# Patient Record
Sex: FEMALE | Race: WHITE | NOT HISPANIC OR LATINO | URBAN - METROPOLITAN AREA
[De-identification: names, ages, dates, MRNs, and addresses within clinical notes are randomized per-mention and may not be internally consistent; named-entity substitution may affect disease eponyms.]

---

## 2017-11-28 ENCOUNTER — OFFICE VISIT (OUTPATIENT)
Dept: CARDIOLOGY | Facility: CLINIC | Age: 42
End: 2017-11-28
Payer: COMMERCIAL

## 2017-11-28 VITALS
HEIGHT: 65 IN | DIASTOLIC BLOOD PRESSURE: 75 MMHG | WEIGHT: 119 LBS | BODY MASS INDEX: 19.83 KG/M2 | HEART RATE: 79 BPM | SYSTOLIC BLOOD PRESSURE: 122 MMHG

## 2017-11-28 DIAGNOSIS — Z82.49 FAMILY HISTORY OF CORONARY ARTERY DISEASE: Primary | ICD-10-CM

## 2017-11-28 PROCEDURE — 93000 ELECTROCARDIOGRAM COMPLETE: CPT | Performed by: INTERNAL MEDICINE

## 2017-11-28 PROCEDURE — 99203 OFFICE O/P NEW LOW 30 MIN: CPT | Mod: 25 | Performed by: INTERNAL MEDICINE

## 2017-11-28 RX ORDER — LORATADINE 10 MG/1
10 CAPSULE, LIQUID FILLED ORAL DAILY
COMMUNITY

## 2017-11-28 RX ORDER — MULTIVIT-MIN/IRON/FOLIC ACID/K 18-600-40
CAPSULE ORAL DAILY
COMMUNITY

## 2017-11-28 RX ORDER — LEVOTHYROXINE SODIUM 112 UG/1
1 CAPSULE ORAL DAILY
COMMUNITY

## 2017-11-28 NOTE — PROGRESS NOTES
HISTORY OF PRESENT ILLNESS:  It is a pleasure for me to see this very delightful young lady for assessment of cardiac risk factors.  She has concerns over her family history.      This lady's mother had bypass surgery at the age of 68.  Mother was fit, slim lady who is physically active.  It sounds like she had a very abnormal stress test and then she had bypass surgery shortly after.  She did not have any atherosclerotic disease diagnosed before the age of 65.  Her mother's siblings have had stents.  Father's side is negative for premature atherosclerotic disease.  She has one brother who has diabetes mellitus.        This is a delightful physically active lady who works at as an  in the [employer].  She walks 4-5 miles a day.  She has no exertional chest discomfort or unusual shortness of breath.  She feels well.  Body mass index is less than 20.  She has no history of diabetes mellitus, hypertension.  She recently had a lipid profile tested and she was told this was completely normal.  She does not smoke, abuse alcohol or drugs.        Her physical examination is completely normal from a cardiovascular point of view.      IMPRESSION:   I reassured this lady that her family history is technically negative.  She has all her cardiac risk factors under good control.  I advised her to continue doing the same.  At this point, I do not think any other testing is necessary.  I should add that now that her EKG is unremarkable.      I wished her well and we will be happy to see her again in future if considered necessary.         MARGARITA SIMEON MD, [facility]             D: 2017 08:56   T: 2017 09:54   MT: KRYSTYNA      Name:     HUONG CASTANEDA   MRN:      [id number]        Account:      [id number]   :      1975           Service Date: 2017      Document: [id number]

## 2017-11-28 NOTE — MR AVS SNAPSHOT
"              After Visit Summary   2017    Radha Tubbs    MRN: [id number]           Patient Information     Date Of Birth          1975        Visit Information        Provider Department      2017 8:15 AM Marco Escamilla MD [facility]        Today's Diagnoses     Family history of coronary artery disease    -  1       Follow-ups after your visit        Who to contact     If you have questions or need follow up information about today's clinic visit or your schedule please contact [facility] directly at [phone].  Normal or non-critical lab and imaging results will be communicated to you by Tinselvisionhart, letter or phone within 4 business days after the clinic has received the results. If you do not hear from us within 7 days, please contact the clinic through Tinselvisionhart or phone. If you have a critical or abnormal lab result, we will notify you by phone as soon as possible.  Submit refill requests through [employer] or call your pharmacy and they will forward the refill request to us. Please allow 3 business days for your refill to be completed.          Additional Information About Your Visit        MyChart Information     StageBloc lets you send messages to your doctor, view your test results, renew your prescriptions, schedule appointments and more. To sign up, go to [email or web address] . Click on \"Log in\" on the left side of the screen, which will take you to the Welcome page. Then click on \"Sign up Now\" on the right side of the page.     You will be asked to enter the access code listed below, as well as some personal information. Please follow the directions to create your username and password.     Your access code is: [email or web address]  Expires: 2018  8:47 AM     Your access code will  in 90 days. If you need help or a new code, please call your [place] clinic or [phone].        Care " "EveryWhere ID     This is your Care EveryWhere ID. This could be used by other organizations to access your [place] medical records  [id number]        Your Vitals Were     Pulse Height BMI (Body Mass Index)             79 1.651 m (5' 5\") 19.8 kg/m2          Blood Pressure from Last 3 Encounters:   11/28/17 122/75    Weight from Last 3 Encounters:   11/28/17 54 kg (119 lb)              We Performed the Following     EKG 12-lead complete w/read - Clinics (performed today)        Primary Care Provider Office Phone # Fax #    Gladys Rick [phone]       [facility] [employer] [facility] [address]  [facility] [address]        Equal Access to Services     BAYRON LANDIS : Hadii aad ku hadasho Soomaali, waaxda luqadaha, qaybta kaalmada adeegyada, waxay kileyin kimberly moser . So [facility] [phone].    ATENCIÓN: Si habla español, tiene a harmon disposición servicios gratuitos de asistencia lingüística. [facility] [phone].    We comply with applicable federal civil rights laws and [place] laws. We do not discriminate on the basis of race, color, national origin, age, disability, sex, sexual orientation, or gender identity.            Thank you!     Thank you for choosing [facility]  for your care. Our goal is always to provide you with excellent care. Hearing back from our patients is one way we can continue to improve our services. Please take a few minutes to complete the written survey that you may receive in the mail after your visit with us. Thank you!             Your Updated Medication List - Protect others around you: Learn how to safely use, store and throw away your medicines at www.disposemymeds.org.          This list is accurate as of: 11/28/17  8:47 AM.  Always use your most recent med list.                   Brand Name Dispense Instructions for use Diagnosis    Levothyroxine Sodium 112 MCG Caps      Take 1 tablet by mouth daily     "    loratadine 10 MG capsule      Take 10 mg by mouth daily        vitamin D 2000 UNITS Caps      Take by mouth daily

## 2017-11-28 NOTE — PROGRESS NOTES
HPI and Plan:   See dictation    Orders Placed This Encounter   Procedures     EKG 12-lead complete w/read - Clinics (performed today)       Orders Placed This Encounter   Medications     Cholecalciferol (VITAMIN D) 2000 UNITS CAPS     Sig: Take by mouth daily     loratadine 10 MG capsule     Sig: Take 10 mg by mouth daily     Levothyroxine Sodium 112 MCG CAPS     Sig: Take 1 tablet by mouth daily       Encounter Diagnosis   Name Primary?     Family history of coronary artery disease Yes       CURRENT MEDICATIONS:  Current Outpatient Prescriptions   Medication Sig Dispense Refill     Cholecalciferol (VITAMIN D) 2000 UNITS CAPS Take by mouth daily       loratadine 10 MG capsule Take 10 mg by mouth daily       Levothyroxine Sodium 112 MCG CAPS Take 1 tablet by mouth daily         ALLERGIES     Allergies   Allergen Reactions     Amoxicillin        PAST MEDICAL HISTORY:  History reviewed. No pertinent past medical history.    PAST SURGICAL HISTORY:  History reviewed. No pertinent surgical history.    FAMILY HISTORY:  Family History   Problem Relation Age of Onset     Coronary Artery Disease Mother      Coronary Artery Disease Other        SOCIAL HISTORY:  Social History     Social History     Marital status:      Spouse name: N/A     Number of children: N/A     Years of education: N/A     Social History Main Topics     Smoking status: Never Smoker     Smokeless tobacco: Never Used     Alcohol use Yes      Comment: moderate     Drug use: None     Sexual activity: Not Asked     Other Topics Concern     None     Social History Narrative     None       Review of Systems:  Skin:  Negative     Eyes:  Positive for glasses  ENT:  Negative    Respiratory:  Negative    Cardiovascular:  Negative    Gastroenterology: Negative    Genitourinary:  Negative    Musculoskeletal:  Negative    Neurologic:  Positive for migraine headaches  Psychiatric:  Positive for excessive stress  Heme/Lymph/Imm:  Positive for  "allergies  Endocrine:  Positive for thyroid disorder    Physical Exam:  Vitals: /75  Pulse 79  Ht 1.651 m (5' 5\")  Wt 54 kg (119 lb)  BMI 19.8 kg/m2    Constitutional:  cooperative, alert and oriented, well developed, well nourished, in no acute distress        Skin:  warm and dry to the touch, no apparent skin lesions or masses noted          Head:  normocephalic, no masses or lesions        Eyes:  pupils equal and round, conjunctivae and lids unremarkable, sclera white, no xanthalasma, EOMS intact, no nystagmus        Lymph:No Cervical lymphadenopathy present     ENT:  no pallor or cyanosis, dentition good        Neck:  carotid pulses are full and equal bilaterally, JVP normal, no carotid bruit        Respiratory:  normal breath sounds, clear to auscultation, normal A-P diameter, normal symmetry, normal respiratory excursion, no use of accessory muscles         Cardiac: regular rhythm, normal S1/S2, no S3 or S4, apical impulse not displaced, no murmurs, gallops or rubs                                                         GI:  abdomen soft, non-tender, BS normoactive, no mass, no HSM, no bruits        Extremities and Muscular Skeletal:  no deformities, clubbing, cyanosis, erythema observed              Neurological:  no gross motor deficits        Psych:  Alert and Oriented x 3        Recent Lab Results:  LIPID RESULTS:  No results found for: CHOL, HDL, LDL, TRIG, CHOLHDLRATIO    LIVER ENZYME RESULTS:  Lab Results   Component Value Date    AST 30 05/30/2009    ALT 63 (H) 05/30/2009       CBC RESULTS:  Lab Results   Component Value Date    WBC 9.4 06/15/2010    RBC 4.59 06/15/2010    HGB 11.9 06/18/2010    HCT 41.0 06/15/2010    MCV 89 06/15/2010    MCH 30.5 06/15/2010    MCHC 34.1 06/15/2010    RDW 13.1 06/15/2010     06/15/2010       BMP RESULTS:  Lab Results   Component Value Date     05/25/2009    POTASSIUM 3.6 05/25/2009    CHLORIDE 108 05/25/2009    CO2 24 05/25/2009    ANIONGAP 2 (L) " 05/25/2009    GLC 73 05/25/2009    BUN 9 05/25/2009    CR 0.69 05/25/2009    GFRESTIMATED >90 05/25/2009    GFRESTBLACK >90 05/25/2009    BRUCE 8.7 05/25/2009        A1C RESULTS:  Lab Results   Component Value Date    A1C 4.7 05/25/2009       INR RESULTS:  Lab Results   Component Value Date    INR 0.93 06/15/2010    INR 1.00 05/25/2009           CC  No referring provider defined for this encounter.

## 2017-11-28 NOTE — LETTER
11/28/2017    Gladys Rick MD  [facility]   [address]  [facility] [address]    RE: Radha Tubbs       Dear Colleague,    I had the pleasure of seeing Radha TERRI Tubbs in the [facility].    HISTORY OF PRESENT ILLNESS:  It is a pleasure for me to see this very delightful young lady for assessment of cardiac risk factors.  She has concerns over her family history.      This lady's mother had bypass surgery at the age of 68.  Mother was fit, slim lady who is physically active.  It sounds like she had a very abnormal stress test and then she had bypass surgery shortly after.  She did not have any atherosclerotic disease diagnosed before the age of 67.  Her mother's siblings have had stents.  Father's side is negative for premature atherosclerotic disease.  She has one brother who has diabetes mellitus.        This is a delightful physically active lady who works at as an  in the [employer].  She walks 4-5 miles a day.  She has no exertional chest discomfort or unusual shortness of breath.  She feels well.  Body mass index is less than 20.  She has no history of diabetes mellitus, hypertension.  She recently had a lipid profile tested and she was told this was completely normal.  She does not smoke, abuse alcohol or drugs.        Her physical examination is completely normal from a cardiovascular point of view.     Outpatient Encounter Prescriptions as of 11/28/2017   Medication Sig Dispense Refill     Cholecalciferol (VITAMIN D) 2000 UNITS CAPS Take by mouth daily       loratadine 10 MG capsule Take 10 mg by mouth daily       Levothyroxine Sodium 112 MCG CAPS Take 1 tablet by mouth daily       No facility-administered encounter medications on file as of 11/28/2017.       IMPRESSION:   I reassured this lady that her family history is technically negative.  She has all her cardiac risk factors under good control.   I advised her to continue doing the same.  At this point, I do not think any other testing is necessary.  I should add that now that her EKG is unremarkable.      I wished her well and we will be happy to see her again in future if considered necessary.     Again, thank you for allowing me to participate in the care of your patient.      Sincerely,    DR MARGARITA SIMEON MD     [facility]

## 2018-11-21 ENCOUNTER — HOSPITAL ENCOUNTER (OUTPATIENT)
Dept: MAMMOGRAPHY | Facility: CLINIC | Age: 43
Discharge: HOME | End: 2018-11-21
Attending: FAMILY MEDICINE | Admitting: FAMILY MEDICINE
Payer: COMMERCIAL

## 2018-11-21 DIAGNOSIS — Z12.31 VISIT FOR SCREENING MAMMOGRAM: ICD-10-CM

## 2018-11-21 PROCEDURE — 77067 SCR MAMMO BI INCL CAD: CPT

## 2020-06-24 ENCOUNTER — TRANSFERRED RECORDS (OUTPATIENT)
Dept: HEALTH INFORMATION MANAGEMENT | Facility: CLINIC | Age: 45
End: 2020-06-24

## 2020-06-26 ENCOUNTER — TRANSFERRED RECORDS (OUTPATIENT)
Dept: HEALTH INFORMATION MANAGEMENT | Facility: CLINIC | Age: 45
End: 2020-06-26

## 2020-06-29 ENCOUNTER — TRANSFERRED RECORDS (OUTPATIENT)
Dept: HEALTH INFORMATION MANAGEMENT | Facility: CLINIC | Age: 45
End: 2020-06-29

## 2020-07-02 ENCOUNTER — TRANSFERRED RECORDS (OUTPATIENT)
Dept: HEALTH INFORMATION MANAGEMENT | Facility: CLINIC | Age: 45
End: 2020-07-02

## 2020-07-02 ENCOUNTER — TELEPHONE (OUTPATIENT)
Dept: SURGERY | Facility: CLINIC | Age: 45
End: 2020-07-02

## 2020-07-02 NOTE — TELEPHONE ENCOUNTER
Call placed to patient at request of Dr. Fuller. Introduced self as oncology care coordinator. Discussed pathology in great detail. Claudine is scheduled for a breast mri this evening at [facility]. Will request report and images in am prior to surgical consultation. Both parties in agreement of plan.    Arina Good RN, BSN, OCN  Oncology Care Coordinator  [facility]  [facility]  Phone: [phone]

## 2020-07-02 NOTE — PROGRESS NOTES
[facility] Breast Surgery Consultation    HPI:   Radha Tubbs is a 44 year old female who is seen in consultation at the request of Dr. Rick for evaluation of newly diagnosed right breast invasive ductal carcinoma, grade 1, ER/AZ positive (100%), HER 2 equivocal by IHC and reflex to FISH pending.     She had a screening mammogram on 6/24/2020 which revealed an asymmetry in the right upper breast, middle depth on MLO imaging only. She then had diagnostic imaging which revealed an irregular, hypoechoic mass at 10:00, 5cm FN. There was no adenopathy in the right axilla. She had an MRI which revealed a 1.5cm biopsied mass in the right upper outer breast.     She reports she had no breast concerns prior to her screening mammogram. No breast pain, nipple drainage or discharge. No prior breast biopsies or surgeries. She is otherwise healthy aside from hypothyroidism.    Hormonal history:  menarche 15, 2 children, 1st at age 32,  Pre-menopausal, >12 years OCP use, current Mirena IUD in place, no HRT, no fertility treatment. Did have two miscarriages, 2nd was more complicated due to HELLP syndrome and need for lovenox injection following admission. No h/o blood clots.      Family history of breast cancer: Yes - two paternal aunts at age 60 and 62, both with negative genetic testing.   Family history of ovarian cancer:  No  Family history of colon cancer: No  Family history of prostate cancer: No    Imaging:     Imaging completed at [facility], all imaging reviewed.     Percutaneous core needle biopsy, right:   As above,  right breast invasive ductal carcinoma, grade 1, ER/AZ positive (100%), HER 2 equivocal by IHC and reflex to FISH pending.     Past Medical History:  Hypothyroidism  HELLP syndrome  Raynaud's      Current Outpatient Medications:      Cholecalciferol (VITAMIN D) 2000 UNITS CAPS, Take by mouth daily, Disp: , Rfl:      Levothyroxine Sodium 112 MCG CAPS, Take 1 tablet by mouth daily, Disp: ,  Rfl:      loratadine 10 MG capsule, Take 10 mg by mouth daily, Disp: , Rfl:     Past Surgical History:    Bunion surgery        Allergies   Allergen Reactions     Amoxicillin         Social History:  Social History     Socioeconomic History     Marital status:      Spouse name: Not on file     Number of children: Not on file     Years of education: Not on file     Highest education level: Not on file   Occupational History     Not on file   Social Needs     Financial resource strain: Not on file     Food insecurity     Worry: Not on file     Inability: Not on file     Transportation needs     Medical: Not on file     Non-medical: Not on file   Tobacco Use     Smoking status: Never Smoker     Smokeless tobacco: Never Used   Substance and Sexual Activity     Alcohol use: Yes     Comment: moderate     Drug use: Not on file     Sexual activity: Not on file   Lifestyle     Physical activity     Days per week: Not on file     Minutes per session: Not on file     Stress: Not on file   Relationships     Social connections     Talks on phone: Not on file     Gets together: Not on file     Attends Shinto service: Not on file     Active member of club or organization: Not on file     Attends meetings of clubs or organizations: Not on file     Relationship status: Not on file     Intimate partner violence     Fear of current or ex partner: Not on file     Emotionally abused: Not on file     Physically abused: Not on file     Forced sexual activity: Not on file   Other Topics Concern     Parent/sibling w/ CABG, MI or angioplasty before 65F 55M? Not Asked   Social History Narrative     Not on file    Works as an .     ROS:  The 10 point review of systems is negative other than noted in the HPI and above.    PE:  Vitals: /62   Pulse 74   General appearance: well-nourished, sitting comfortably, no apparent distress  Psych: normal affect, pleasant  HEENT:  Head normocephalic and  atraumatic, pupils equal and round, conjunctivae clear, mucous membranes moist, external ears and nose normal  Neck: Supple without thyromegaly or masses  Lungs: Respirations unlabored  Lymphatic: No cervical, or supraclavicular lymphadenopathy  Extremities: Without edema  Musculoskeletal:  Normal station and gait  Neurologic: nonfocal, grossly intact times four extremities, alert and oriented times three  Psychiatric: Mood and affect are appropriate  Skin: Without lesions or rashes    Breast:  A bilateral breast exam was performed in the supine position.. Bilateral breasts were palpated in a circumferential clockwise fashion including the supraclavicular and axillary areas.   On the right upper outer breast there is a 1.5cm palpable mass with surrounding dense breast tissue, mild skin ecchymosis present overlying this area. The NAC are normal bilaterally, nipples everted. No other masses. Normal skin. Breasts are symmetric    Lymph:       No supraclavicular/infraclavicular adenopathy.   Axillary adenopathy: none    Assessment:  Right  breast invasive ductal carcinoma, grade 1, ER +, HI +, [id number] pending measuring 1.5 cm at 10:00 and 5 cm from the nipple.    Plan:  Claudine is a very pleasant 44yof who unfortunately has newly diagnosed right breast cancer.  I reviewed the imaging and pathology reports with her and her  and explained the findings.  We talked about the fact that this is invasive ductal carcinoma  that is small in size and was found on screening mammogram.   We discussed the receptor status of ER/HI strongly positive and HER 2 equivocal by IHC with FISH pending. We discussed that breast cancer is treated in a multidisciplinary fashion and she will also meet with oncology as well as radiation oncology pending surgical decision making and final pathology results.     We next discussed the surgical options for treatment.  I described the procedures for lumpectomy with sentinel lymph node biopsy and  mastectomy with sentinel lymph node biopsy, possible axillary node dissection including the details of the procedures, the risks, anesthesia and expected recovery.      I reviewed the data regarding lumpectomy and radiation vs mastectomy that shows that the local recurrence risk is slightly higher for lumpectomy and radiation vs mastectomy (3-5% vs. 1-2%), but the survival at 20 years is the same.  I advised that lymph node biopsy is recommended whenever we are dealing with invasive breast cancer and described the procedure for sentinel lymph node biopsy.  We talked about the risk for lymphedema which is small with removal of only a few nodes, but certainly not zero.  I also explained that since this is a small tumor and was not palpable on clinical breast exam prior to the biopsy, I would ask radiology to place a radioactive seed pre-operatively for localization.    We talked about post-lumpectomy radiation, the course and usual side effects. We discussed that with lumpectomy, radiation is typically recommended to decrease risk of recurrence. It may be necessary following mastectomy depending on final pathology and if annel involvement is present.     We also talked about post-mastectomy reconstruction and the stages involved. We also discussed the various types of mastectomy, including total, skin-sparing, and nipple-sparing mastectomy.  We reviewed that the nipple-sparing technique is cosmetic; sensation and contractility will likely be lost.  Claudine  is a candidate for nipple-sparing mastectomy from an oncologic perspective .  The option of having immediate versus delayed reconstruction was also discussed.   We reviewed that the advantages of immediate reconstruction includes superior cosmetics, as the skin is preserved.      In addition, I have recommended genetic counseling.  She would be a candidate in that situation based on her young age at diagnosis.  The natural history of BRCA mutations and breast cancer  were discussed with the patient. Should a deleterious mutation be identified, she would no longer be a good candidate for breast conservation.  We also reviewed the risk reduction benefits of a prophylactic mastectomy in this situation.     Plan:   Await HER 2 results  Follow up on MRI results  Plastic surgery referral  Schedule surgery pending above. Pt leaning toward lumpectomy with [facility]NB at this time but is very unsure.        Time spent with the patient with greater that 50% of the time in discussion was 60 minutes.    Fabi Fuller MD      Please route or send letter to:  Primary Care Provider (PCP) and Referring Provider

## 2020-07-03 ENCOUNTER — OFFICE VISIT (OUTPATIENT)
Dept: SURGERY | Facility: CLINIC | Age: 45
End: 2020-07-03
Payer: COMMERCIAL

## 2020-07-03 ENCOUNTER — HOSPITAL ENCOUNTER (OUTPATIENT)
Dept: LAB | Facility: CLINIC | Age: 45
Discharge: HOME | End: 2020-07-03
Admitting: SURGERY
Payer: COMMERCIAL

## 2020-07-03 VITALS — SYSTOLIC BLOOD PRESSURE: 112 MMHG | DIASTOLIC BLOOD PRESSURE: 62 MMHG | HEART RATE: 74 BPM

## 2020-07-03 DIAGNOSIS — Z17.0 MALIGNANT NEOPLASM OF UPPER-OUTER QUADRANT OF RIGHT BREAST IN FEMALE, ESTROGEN RECEPTOR POSITIVE (H): ICD-10-CM

## 2020-07-03 DIAGNOSIS — C50.411 MALIGNANT NEOPLASM OF UPPER-OUTER QUADRANT OF RIGHT BREAST IN FEMALE, ESTROGEN RECEPTOR POSITIVE (H): Primary | ICD-10-CM

## 2020-07-03 DIAGNOSIS — C50.411 MALIGNANT NEOPLASM OF UPPER-OUTER QUADRANT OF RIGHT BREAST IN FEMALE, ESTROGEN RECEPTOR POSITIVE (H): ICD-10-CM

## 2020-07-03 DIAGNOSIS — Z17.0 MALIGNANT NEOPLASM OF UPPER-OUTER QUADRANT OF RIGHT BREAST IN FEMALE, ESTROGEN RECEPTOR POSITIVE (H): Primary | ICD-10-CM

## 2020-07-03 PROCEDURE — 36415 COLL VENOUS BLD VENIPUNCTURE: CPT | Performed by: SURGERY

## 2020-07-03 PROCEDURE — 99205 OFFICE O/P NEW HI 60 MIN: CPT | Performed by: SURGERY

## 2020-07-03 PROCEDURE — 40000803 ZZHCL STATISTIC DNA ISOL HIGH PURITY: Performed by: SURGERY

## 2020-07-03 NOTE — NURSING NOTE
Introduced self and explained my role of oncology care coordinator to patient. Accompanied Claudine and her  to her surgical consultation today with Dr. Fuller.      Claudine was given the new patient packet which includes educational material and support resources such as American Cancer Society: For Women Facing Breast Cancer, What You Need to Know about Breast Conservation and Mastectomy Surgery, [employer], Fighting Cancer through Diet and Lifestyle, and [facility] Breast Cancer Support Group.     At the end of the consultation, we reviewed plan of care and education. Await results of HER2 and report from Breast MRI.    Claudine has my contact information and knows to contact me in the future with any questions/concerns.     Arina Good RN, BSN, OCN  Oncology Care Coordinator  [facility]  [facility]   Phone: [phone]

## 2020-07-03 NOTE — LETTER
[facility]    [address], [address]  [place], [place] [address]  Phone [phone]  Fax [phone] [address], [address]  [facility] [place] [address]  Phone [phone]  Fax [phone]    [email or web address]     July 3, 2020    Re: Radha Tubbs  : 1975      [facility] Breast Surgery Consultation   HPI:   Radha Tubbs is a 44 year old female who is seen in consultation at the request of Dr. Rick for evaluation of newly diagnosed right breast invasive ductal carcinoma, grade 1, ER/NH positive (100%), HER 2 equivocal by IHC and reflex to FISH pending.   She had a screening mammogram on 2020 which revealed an asymmetry in the right upper breast, middle depth on MLO imaging only. She then had diagnostic imaging which revealed an irregular, hypoechoic mass at 10:00, 5cm FN. There was no adenopathy in the right axilla. She had an MRI which revealed a 1.5cm biopsied mass in the right upper outer breast.   She reports she had no breast concerns prior to her screening mammogram. No breast pain, nipple drainage or discharge. No prior breast biopsies or surgeries. She is otherwise healthy aside from hypothyroidism.   Hormonal history: menarche 15, 2 children, 1st at age 32, Pre-menopausal, >12 years OCP use, current Mirena IUD in place, no HRT, no fertility treatment. Did have two miscarriages, 2nd was more complicated due to HELLP syndrome and need for lovenox injection following admission. No h/o blood clots.   Family history of breast cancer: Yes - two paternal aunts at age 60 and 62, both with negative genetic testing.   Family history of ovarian cancer: No   Family history of colon cancer: No   Family history of prostate cancer: No   Imaging:   Imaging completed at [facility], all imaging reviewed.   Percutaneous core needle biopsy, right:   As above, right breast invasive ductal carcinoma,  grade 1, ER/GA positive (100%), HER 2 equivocal by IHC and reflex to FISH pending.   Past Medical History:   Hypothyroidism   HELLP syndrome   Raynaud's   Current Outpatient Medications:     Cholecalciferol (VITAMIN D) 2000 UNITS CAPS, Take by mouth daily, Disp: , Rfl:     Levothyroxine Sodium 112 MCG CAPS, Take 1 tablet by mouth daily, Disp: , Rfl:     loratadine 10 MG capsule, Take 10 mg by mouth daily, Disp: , Rfl:   Past Surgical History:      Bunion surgery        Allergies   Allergen Reactions     Amoxicillin    Social History:   Social History   Social History         Socioeconomic History     Marital status:      Spouse name: Not on file     Number of children: Not on file     Years of education: Not on file     Highest education level: Not on file   Occupational History     Not on file   Social Needs     Financial resource strain: Not on file     Food insecurity     Worry: Not on file     Inability: Not on file     Transportation needs     Medical: Not on file     Non-medical: Not on file   Tobacco Use     Smoking status: Never Smoker     Smokeless tobacco: Never Used   Substance and Sexual Activity     Alcohol use: Yes     Comment: moderate     Drug use: Not on file     Sexual activity: Not on file   Lifestyle     Physical activity     Days per week: Not on file     Minutes per session: Not on file     Stress: Not on file   Relationships     Social connections     Talks on phone: Not on file     Gets together: Not on file     Attends Muslim service: Not on file     Active member of club or organization: Not on file     Attends meetings of clubs or organizations: Not on file     Relationship status: Not on file     Intimate partner violence     Fear of current or ex partner: Not on file     Emotionally abused: Not on file     Physically abused: Not on file     Forced sexual activity: Not on file   Other Topics Concern     Parent/sibling w/ CABG, MI or angioplasty before 65F 55M? Not Asked    Social History Narrative     Not on file   Works as an .   ROS:   The 10 point review of systems is negative other than noted in the HPI and above.   PE:   Vitals: /62  Pulse 74   General appearance: well-nourished, sitting comfortably, no apparent distress   Psych: normal affect, pleasant   HEENT: Head normocephalic and atraumatic, pupils equal and round, conjunctivae clear, mucous membranes moist, external ears and nose normal   Neck: Supple without thyromegaly or masses   Lungs: Respirations unlabored   Lymphatic: No cervical, or supraclavicular lymphadenopathy   Extremities: Without edema   Musculoskeletal: Normal station and gait   Neurologic: nonfocal, grossly intact times four extremities, alert and oriented times three   Psychiatric: Mood and affect are appropriate   Skin: Without lesions or rashes   Breast: A bilateral breast exam was performed in the supine position.. Bilateral breasts were palpated in a circumferential clockwise fashion including the supraclavicular and axillary areas.   On the right upper outer breast there is a 1.5cm palpable mass with surrounding dense breast tissue, mild skin ecchymosis present overlying this area. The NAC are normal bilaterally, nipples everted. No other masses. Normal skin. Breasts are symmetric   Lymph:   No supraclavicular/infraclavicular adenopathy.   Axillary adenopathy: none   Assessment: Right breast invasive ductal carcinoma, grade 1, ER +, CO +, [id number] pending measuring 1.5 cm at 10:00 and 5 cm from the nipple.   Plan:   Claudine is a very pleasant 44yof who unfortunately has newly diagnosed right breast cancer. I reviewed the imaging and pathology reports with her and her  and explained the findings. We talked about the fact that this is invasive ductal carcinoma that is small in size and was found on screening mammogram. We discussed the receptor status of ER/CO strongly positive and HER 2 equivocal by IHC with  FISH pending. We discussed that breast cancer is treated in a multidisciplinary fashion and she will also meet with oncology as well as radiation oncology pending surgical decision making and final pathology results.   We next discussed the surgical options for treatment. I described the procedures for lumpectomy with sentinel lymph node biopsy and mastectomy with sentinel lymph node biopsy, possible axillary node dissection including the details of the procedures, the risks, anesthesia and expected recovery.   I reviewed the data regarding lumpectomy and radiation vs mastectomy that shows that the local recurrence risk is slightly higher for lumpectomy and radiation vs mastectomy (3-5% vs. 1-2%), but the survival at 20 years is the same. I advised that lymph node biopsy is recommended whenever we are dealing with invasive breast cancer and described the procedure for sentinel lymph node biopsy. We talked about the risk for lymphedema which is small with removal of only a few nodes, but certainly not zero. I also explained that since this is a small tumor and was not palpable on clinical breast exam prior to the biopsy, I would ask radiology to place a radioactive seed pre-operatively for localization.   We talked about post-lumpectomy radiation, the course and usual side effects. We discussed that with lumpectomy, radiation is typically recommended to decrease risk of recurrence. It may be necessary following mastectomy depending on final pathology and if annel involvement is present.   We also talked about post-mastectomy reconstruction and the stages involved. We also discussed the various types of mastectomy, including total, skin-sparing, and nipple-sparing mastectomy. We reviewed that the nipple-sparing technique is cosmetic; sensation and contractility will likely be lost. Claudine is a candidate for nipple-sparing mastectomy from an oncologic perspective . The option of having immediate versus delayed  reconstruction was also discussed. We reviewed that the advantages of immediate reconstruction includes superior cosmetics, as the skin is preserved.   In addition, I have recommended genetic counseling. She would be a candidate in that situation based on her young age at diagnosis. The natural history of BRCA mutations and breast cancer were discussed with the patient. Should a deleterious mutation be identified, she would no longer be a good candidate for breast conservation. We also reviewed the risk reduction benefits of a prophylactic mastectomy in this situation.   Plan:   Await HER 2 results   Follow up on MRI results   Plastic surgery referral   Schedule surgery pending above. Pt leaning toward lumpectomy with SLNB at this time but is very unsure.   Time spent with the patient with greater that 50% of the time in discussion was 60 minutes.   Fabi Fuller MD

## 2020-07-06 ENCOUNTER — TELEPHONE (OUTPATIENT)
Dept: SURGERY | Facility: CLINIC | Age: 45
End: 2020-07-06

## 2020-07-06 NOTE — TELEPHONE ENCOUNTER
You are scheduled with Dr. Janak Preston at [facility] on 7/10/20 at 8:30am; checking in at 8:15am.

## 2020-07-07 ENCOUNTER — TELEPHONE (OUTPATIENT)
Dept: SURGERY | Facility: PHYSICIAN GROUP | Age: 45
End: 2020-07-07

## 2020-07-07 LAB — COPATH REPORT: NORMAL

## 2020-07-07 NOTE — TELEPHONE ENCOUNTER
I called Claudine and discussed the new results from her pathology, HER 2 is negative by FISH. We also discussed her MRI final read revealed a 1.5cm irregular mass with irregular margins consistent with biopsy proven malignancy. The left breast appears normal and her lymph nodes are normal.     She will be meeting with Guzman this afternoon for another opinion. She will let us know how she would like to proceed.     Fabi Fuller MD  [facility]  [phone]

## 2020-07-08 NOTE — NURSING NOTE
Introduced self and explained my role of oncology care coordinator to patient. Accompanied Claudine and her  to her surgical consultation today with Dr. Fuller.      Claudine was given the new patient packet which includes educational material and support resources such as American Cancer Society: For Women Facing Breast Cancer, What You Need to Know about Breast Conservation and Mastectomy Surgery, [employer], Fighting Cancer through Diet and Lifestyle, and [facility] Breast Cancer Support Group.     At the end of the consultation, we reviewed plan of care and education. Await results of HER2 and report from Breast MRI.    Claudine has my contact information and knows to contact me in the future with any questions/concerns.     Arina Good RN, BSN, OCN  Oncology Care Coordinator  [facility]  [facility] [place]   Phone: [phone]

## 2020-07-13 ENCOUNTER — CARE COORDINATION (OUTPATIENT)
Dept: SURGERY | Facility: CLINIC | Age: 45
End: 2020-07-13

## 2020-07-13 NOTE — PROGRESS NOTES
Claudine will be proceeding with her care at [facility] for her newly diagnosed breast cancer.    Arina Good RN, BSN, OCN  Oncology Care Coordinator  [facility]  [facility]  Phone: [phone]

## 2020-07-20 DIAGNOSIS — Z17.0 MALIGNANT NEOPLASM OF UPPER-OUTER QUADRANT OF RIGHT BREAST IN FEMALE, ESTROGEN RECEPTOR POSITIVE (H): Primary | ICD-10-CM

## 2020-07-20 DIAGNOSIS — C50.411 MALIGNANT NEOPLASM OF UPPER-OUTER QUADRANT OF RIGHT BREAST IN FEMALE, ESTROGEN RECEPTOR POSITIVE (H): Primary | ICD-10-CM

## 2020-07-20 PROCEDURE — 81405 MOPATH PROCEDURE LEVEL 6: CPT | Performed by: SURGERY

## 2020-07-20 PROCEDURE — 81321 PTEN GENE FULL SEQUENCE: CPT | Performed by: SURGERY

## 2020-07-20 PROCEDURE — 81408 MOPATH PROCEDURE LEVEL 9: CPT | Performed by: SURGERY

## 2020-07-20 PROCEDURE — 81479 UNLISTED MOLECULAR PATHOLOGY: CPT | Performed by: SURGERY

## 2020-07-20 PROCEDURE — 81307 PALB2 GENE FULL GENE SEQ: CPT | Performed by: SURGERY

## 2020-07-20 PROCEDURE — 81323 PTEN GENE DUP/DELET VARIANT: CPT | Performed by: SURGERY

## 2020-07-20 PROCEDURE — 81162 BRCA1&2 GEN FULL SEQ DUP/DEL: CPT | Performed by: SURGERY

## 2020-07-20 PROCEDURE — 81406 MOPATH PROCEDURE LEVEL 7: CPT | Performed by: SURGERY

## 2020-07-22 LAB — COPATH REPORT: NORMAL

## 2021-01-15 ENCOUNTER — HEALTH MAINTENANCE LETTER (OUTPATIENT)
Age: 46
End: 2021-01-15

## 2021-09-05 ENCOUNTER — HEALTH MAINTENANCE LETTER (OUTPATIENT)
Age: 46
End: 2021-09-05

## 2022-02-20 ENCOUNTER — HEALTH MAINTENANCE LETTER (OUTPATIENT)
Age: 47
End: 2022-02-20

## 2022-09-23 ENCOUNTER — MEDICAL CORRESPONDENCE (OUTPATIENT)
Dept: HEALTH INFORMATION MANAGEMENT | Facility: CLINIC | Age: 47
End: 2022-09-23

## 2022-09-23 DIAGNOSIS — R00.2 PALPITATIONS: ICD-10-CM

## 2022-09-23 DIAGNOSIS — R42 DIZZINESS: Primary | ICD-10-CM

## 2022-10-23 ENCOUNTER — HEALTH MAINTENANCE LETTER (OUTPATIENT)
Age: 47
End: 2022-10-23

## 2022-11-02 ENCOUNTER — ANCILLARY PROCEDURE (OUTPATIENT)
Dept: ULTRASOUND IMAGING | Facility: CLINIC | Age: 47
End: 2022-11-02
Attending: FAMILY MEDICINE
Payer: COMMERCIAL

## 2022-11-02 DIAGNOSIS — E04.1 THYROID NODULE: ICD-10-CM

## 2022-11-02 PROCEDURE — 76536 US EXAM OF HEAD AND NECK: CPT

## 2023-01-24 ENCOUNTER — HOSPITAL ENCOUNTER (OUTPATIENT)
Facility: CLINIC | Age: 48
Discharge: HOME | End: 2023-01-24
Attending: COLON & RECTAL SURGERY | Admitting: COLON & RECTAL SURGERY
Payer: COMMERCIAL

## 2023-01-24 VITALS
HEART RATE: 62 BPM | OXYGEN SATURATION: 100 % | DIASTOLIC BLOOD PRESSURE: 75 MMHG | RESPIRATION RATE: 10 BRPM | SYSTOLIC BLOOD PRESSURE: 105 MMHG

## 2023-01-24 LAB — COLONOSCOPY: NORMAL

## 2023-01-24 PROCEDURE — G0121 COLON CA SCRN NOT HI RSK IND: HCPCS | Performed by: COLON & RECTAL SURGERY

## 2023-01-24 PROCEDURE — G0500 MOD SEDAT ENDO SERVICE >5YRS: HCPCS | Performed by: COLON & RECTAL SURGERY

## 2023-01-24 PROCEDURE — 45378 DIAGNOSTIC COLONOSCOPY: CPT | Performed by: COLON & RECTAL SURGERY

## 2023-01-24 PROCEDURE — 250N000011 HC RX IP 250 OP 636: Performed by: COLON & RECTAL SURGERY

## 2023-01-24 RX ORDER — FENTANYL CITRATE 50 UG/ML
INJECTION, SOLUTION INTRAMUSCULAR; INTRAVENOUS PRN
Status: DISCONTINUED | OUTPATIENT
Start: 2023-01-24 | End: 2023-01-24 | Stop reason: HOSPADM

## 2023-01-24 RX ORDER — NALOXONE HYDROCHLORIDE 0.4 MG/ML
0.4 INJECTION, SOLUTION INTRAMUSCULAR; INTRAVENOUS; SUBCUTANEOUS
Status: DISCONTINUED | OUTPATIENT
Start: 2023-01-24 | End: 2023-01-24 | Stop reason: HOSPADM

## 2023-01-24 RX ORDER — LIDOCAINE 40 MG/G
CREAM TOPICAL
Status: DISCONTINUED | OUTPATIENT
Start: 2023-01-24 | End: 2023-01-24 | Stop reason: HOSPADM

## 2023-01-24 RX ORDER — FLUMAZENIL 0.1 MG/ML
0.2 INJECTION, SOLUTION INTRAVENOUS
Status: DISCONTINUED | OUTPATIENT
Start: 2023-01-24 | End: 2023-01-24 | Stop reason: HOSPADM

## 2023-01-24 RX ORDER — NALOXONE HYDROCHLORIDE 0.4 MG/ML
0.2 INJECTION, SOLUTION INTRAMUSCULAR; INTRAVENOUS; SUBCUTANEOUS
Status: DISCONTINUED | OUTPATIENT
Start: 2023-01-24 | End: 2023-01-24 | Stop reason: HOSPADM

## 2023-01-24 RX ORDER — PROCHLORPERAZINE MALEATE 10 MG
10 TABLET ORAL EVERY 6 HOURS PRN
Status: DISCONTINUED | OUTPATIENT
Start: 2023-01-24 | End: 2023-01-24 | Stop reason: HOSPADM

## 2023-01-24 RX ORDER — ONDANSETRON 2 MG/ML
4 INJECTION INTRAMUSCULAR; INTRAVENOUS
Status: DISCONTINUED | OUTPATIENT
Start: 2023-01-24 | End: 2023-01-24 | Stop reason: HOSPADM

## 2023-01-24 RX ORDER — TAMOXIFEN CITRATE 20 MG/1
20 TABLET ORAL DAILY
COMMUNITY
Start: 2021-08-23

## 2023-01-24 RX ORDER — ONDANSETRON 4 MG/1
4 TABLET, ORALLY DISINTEGRATING ORAL EVERY 6 HOURS PRN
Status: DISCONTINUED | OUTPATIENT
Start: 2023-01-24 | End: 2023-01-24 | Stop reason: HOSPADM

## 2023-01-24 RX ORDER — ONDANSETRON 2 MG/ML
4 INJECTION INTRAMUSCULAR; INTRAVENOUS EVERY 6 HOURS PRN
Status: DISCONTINUED | OUTPATIENT
Start: 2023-01-24 | End: 2023-01-24 | Stop reason: HOSPADM

## 2023-01-24 ASSESSMENT — ACTIVITIES OF DAILY LIVING (ADL): ADLS_ACUITY_SCORE: 35

## 2023-01-24 NOTE — H&P
"Pre-Endoscopy History and Physical   Radha Woodruff MRN# [id number]   YOB: 1975 Age: 47 year old   Date of Procedure: 1/24/2023   Primary care provider: Gladys Rick   Type of Endoscopy: colonoscopy   Reason for Procedure: screening   Type of Anesthesia Anticipated: Moderate Sedation   HPI:   Radha is a 47 year old female for screening colonoscopy.  She has never had a colonoscopy before.  She denies BRBPR, abdominal pain, nausea/vomiting, changes in bowel habits or unintentional weight loss.  She denies a FH of CRC.    Allergies   Allergen Reactions     Amoxicillin       Prior to Admission Medications   Prescriptions Last Dose Informant Patient Reported? Taking?   Cholecalciferol (VITAMIN D) 2000 UNITS CAPS Past Week  Yes Yes   Sig: Take by mouth daily   Levothyroxine Sodium 112 MCG CAPS 1/23/2023 at 0800  Yes Yes   Sig: Take 1 tablet by mouth daily   loratadine 10 MG capsule Past Week  Yes Yes   Sig: Take 10 mg by mouth daily   tamoxifen (NOLVADEX) 20 MG tablet 1/23/2023 at 0600  Yes Yes   Sig: Take 20 mg by mouth daily      Facility-Administered Medications: None      Patient Active Problem List   Diagnosis     Pain in joint, pelvic region and thigh     Lumbago     Family history of coronary artery disease      Past Medical History:   Diagnosis Date     Breast cancer (H)      Thyroid disease       Past Surgical History:   Procedure Laterality Date     bunion surgery        MASTECTOMY, BILATERAL       miscarriage        Social History     Tobacco Use     Smoking status: Never     Smokeless tobacco: Never   Substance Use Topics     Alcohol use: Yes     Comment: moderate      Family History   Problem Relation Age of Onset     Coronary Artery Disease Mother      Coronary Artery Disease Other       PHYSICAL EXAM:   /69   Pulse 71   Resp 16   SpO2 99%  Estimated body mass index is 19.8 kg/m  as calculated from the following:    Height as of 11/28/17: 1.651 m (5' 5\").    Weight as " of 11/28/17: 54 kg (119 lb).   RESP: lungs clear to auscultation - no rales, rhonchi or wheezes   CV: regular rates and rhythm   ASA Class 2 - Mild systemic disease    Assessment: 46 y/o woman for screening colonoscopy    Plan: Colonoscopy with moderate sedation.  Risks of the procedure were discussed including, but not limited to, bleeding, perforation and missed lesions.  Patient understands and is willing to proceed.    Syd Ulloa MD ....................  1/24/2023   8:06 AM  Colon and Rectal Surgery Staff  [phone]

## 2023-04-02 ENCOUNTER — HEALTH MAINTENANCE LETTER (OUTPATIENT)
Age: 48
End: 2023-04-02

## 2024-06-02 ENCOUNTER — HEALTH MAINTENANCE LETTER (OUTPATIENT)
Age: 49
End: 2024-06-02

## 2024-12-27 ENCOUNTER — ANCILLARY PROCEDURE (OUTPATIENT)
Dept: ULTRASOUND IMAGING | Facility: CLINIC | Age: 49
End: 2024-12-27
Attending: INTERNAL MEDICINE
Payer: COMMERCIAL

## 2024-12-27 DIAGNOSIS — E04.2 MULTIPLE THYROID NODULES: ICD-10-CM

## 2024-12-27 PROCEDURE — 76536 US EXAM OF HEAD AND NECK: CPT

## 2025-06-15 ENCOUNTER — HEALTH MAINTENANCE LETTER (OUTPATIENT)
Age: 50
End: 2025-06-15

## (undated) DEVICE — SOL WATER IRRIG 1000ML BOTTLE 2F7114

## (undated) RX ORDER — FENTANYL CITRATE 50 UG/ML
INJECTION, SOLUTION INTRAMUSCULAR; INTRAVENOUS
Status: DISPENSED
Start: 2023-01-24